# Patient Record
Sex: MALE | Race: WHITE | NOT HISPANIC OR LATINO | Employment: FULL TIME | URBAN - METROPOLITAN AREA
[De-identification: names, ages, dates, MRNs, and addresses within clinical notes are randomized per-mention and may not be internally consistent; named-entity substitution may affect disease eponyms.]

---

## 2024-03-27 ENCOUNTER — HOSPITAL ENCOUNTER (EMERGENCY)
Facility: HOSPITAL | Age: 39
Discharge: HOME | End: 2024-03-27
Attending: EMERGENCY MEDICINE
Payer: COMMERCIAL

## 2024-03-27 ENCOUNTER — APPOINTMENT (OUTPATIENT)
Dept: CARDIOLOGY | Facility: HOSPITAL | Age: 39
End: 2024-03-27
Payer: COMMERCIAL

## 2024-03-27 VITALS
DIASTOLIC BLOOD PRESSURE: 74 MMHG | HEART RATE: 97 BPM | WEIGHT: 207.23 LBS | BODY MASS INDEX: 33.3 KG/M2 | TEMPERATURE: 97.3 F | SYSTOLIC BLOOD PRESSURE: 138 MMHG | OXYGEN SATURATION: 100 % | HEIGHT: 66 IN | RESPIRATION RATE: 17 BRPM

## 2024-03-27 DIAGNOSIS — R19.7 DIARRHEA, UNSPECIFIED TYPE: ICD-10-CM

## 2024-03-27 DIAGNOSIS — T78.40XA ALLERGIC REACTION, INITIAL ENCOUNTER: Primary | ICD-10-CM

## 2024-03-27 LAB
ALBUMIN SERPL BCP-MCNC: 4.2 G/DL (ref 3.4–5)
ALP SERPL-CCNC: 51 U/L (ref 33–120)
ALT SERPL W P-5'-P-CCNC: 21 U/L (ref 10–52)
ANION GAP SERPL CALC-SCNC: 17 MMOL/L (ref 10–20)
AST SERPL W P-5'-P-CCNC: 20 U/L (ref 9–39)
BASOPHILS # BLD AUTO: 0.03 X10*3/UL (ref 0–0.1)
BASOPHILS NFR BLD AUTO: 0.3 %
BILIRUB SERPL-MCNC: 0.6 MG/DL (ref 0–1.2)
BUN SERPL-MCNC: 20 MG/DL (ref 6–23)
CALCIUM SERPL-MCNC: 9 MG/DL (ref 8.6–10.3)
CHLORIDE SERPL-SCNC: 107 MMOL/L (ref 98–107)
CO2 SERPL-SCNC: 16 MMOL/L (ref 21–32)
CREAT SERPL-MCNC: 1.23 MG/DL (ref 0.5–1.3)
EGFRCR SERPLBLD CKD-EPI 2021: 78 ML/MIN/1.73M*2
EOSINOPHIL # BLD AUTO: 0.24 X10*3/UL (ref 0–0.7)
EOSINOPHIL NFR BLD AUTO: 2.1 %
ERYTHROCYTE [DISTWIDTH] IN BLOOD BY AUTOMATED COUNT: 12.4 % (ref 11.5–14.5)
FLUAV RNA RESP QL NAA+PROBE: NOT DETECTED
FLUBV RNA RESP QL NAA+PROBE: NOT DETECTED
GLUCOSE SERPL-MCNC: 125 MG/DL (ref 74–99)
HCT VFR BLD AUTO: 54.5 % (ref 41–52)
HGB BLD-MCNC: 17.9 G/DL (ref 13.5–17.5)
IMM GRANULOCYTES # BLD AUTO: 0.04 X10*3/UL (ref 0–0.7)
IMM GRANULOCYTES NFR BLD AUTO: 0.4 % (ref 0–0.9)
LACTATE SERPL-SCNC: 2.1 MMOL/L (ref 0.4–2)
LIPASE SERPL-CCNC: 19 U/L (ref 9–82)
LYMPHOCYTES # BLD AUTO: 3.75 X10*3/UL (ref 1.2–4.8)
LYMPHOCYTES NFR BLD AUTO: 33.4 %
MAGNESIUM SERPL-MCNC: 2.02 MG/DL (ref 1.6–2.4)
MCH RBC QN AUTO: 29.3 PG (ref 26–34)
MCHC RBC AUTO-ENTMCNC: 32.8 G/DL (ref 32–36)
MCV RBC AUTO: 89 FL (ref 80–100)
MONOCYTES # BLD AUTO: 1.07 X10*3/UL (ref 0.1–1)
MONOCYTES NFR BLD AUTO: 9.5 %
NEUTROPHILS # BLD AUTO: 6.09 X10*3/UL (ref 1.2–7.7)
NEUTROPHILS NFR BLD AUTO: 54.3 %
NRBC BLD-RTO: 0 /100 WBCS (ref 0–0)
PHOSPHATE SERPL-MCNC: 4.4 MG/DL (ref 2.5–4.9)
PLATELET # BLD AUTO: 334 X10*3/UL (ref 150–450)
POTASSIUM SERPL-SCNC: 4 MMOL/L (ref 3.5–5.3)
PROT SERPL-MCNC: 7.6 G/DL (ref 6.4–8.2)
RBC # BLD AUTO: 6.1 X10*6/UL (ref 4.5–5.9)
SARS-COV-2 RNA RESP QL NAA+PROBE: NOT DETECTED
SODIUM SERPL-SCNC: 136 MMOL/L (ref 136–145)
WBC # BLD AUTO: 11.2 X10*3/UL (ref 4.4–11.3)

## 2024-03-27 PROCEDURE — 87502 INFLUENZA DNA AMP PROBE: CPT | Performed by: EMERGENCY MEDICINE

## 2024-03-27 PROCEDURE — 85025 COMPLETE CBC W/AUTO DIFF WBC: CPT | Performed by: EMERGENCY MEDICINE

## 2024-03-27 PROCEDURE — 93005 ELECTROCARDIOGRAM TRACING: CPT

## 2024-03-27 PROCEDURE — 83735 ASSAY OF MAGNESIUM: CPT | Performed by: EMERGENCY MEDICINE

## 2024-03-27 PROCEDURE — 99284 EMERGENCY DEPT VISIT MOD MDM: CPT | Mod: 25

## 2024-03-27 PROCEDURE — 83690 ASSAY OF LIPASE: CPT | Performed by: EMERGENCY MEDICINE

## 2024-03-27 PROCEDURE — 2500000004 HC RX 250 GENERAL PHARMACY W/ HCPCS (ALT 636 FOR OP/ED): Performed by: EMERGENCY MEDICINE

## 2024-03-27 PROCEDURE — 36415 COLL VENOUS BLD VENIPUNCTURE: CPT | Performed by: EMERGENCY MEDICINE

## 2024-03-27 PROCEDURE — 96374 THER/PROPH/DIAG INJ IV PUSH: CPT

## 2024-03-27 PROCEDURE — 84100 ASSAY OF PHOSPHORUS: CPT | Performed by: EMERGENCY MEDICINE

## 2024-03-27 PROCEDURE — 83605 ASSAY OF LACTIC ACID: CPT | Performed by: EMERGENCY MEDICINE

## 2024-03-27 PROCEDURE — 80053 COMPREHEN METABOLIC PANEL: CPT | Performed by: EMERGENCY MEDICINE

## 2024-03-27 PROCEDURE — 96361 HYDRATE IV INFUSION ADD-ON: CPT

## 2024-03-27 PROCEDURE — 96375 TX/PRO/DX INJ NEW DRUG ADDON: CPT

## 2024-03-27 RX ORDER — FAMOTIDINE 10 MG/ML
20 INJECTION INTRAVENOUS ONCE
Status: COMPLETED | OUTPATIENT
Start: 2024-03-27 | End: 2024-03-27

## 2024-03-27 RX ORDER — DIPHENHYDRAMINE HYDROCHLORIDE 50 MG/ML
50 INJECTION INTRAMUSCULAR; INTRAVENOUS ONCE
Status: COMPLETED | OUTPATIENT
Start: 2024-03-27 | End: 2024-03-27

## 2024-03-27 RX ORDER — PREDNISONE 50 MG/1
50 TABLET ORAL DAILY
Qty: 2 TABLET | Refills: 0 | Status: SHIPPED | OUTPATIENT
Start: 2024-03-27 | End: 2024-03-29

## 2024-03-27 RX ADMIN — SODIUM CHLORIDE 1000 ML: 9 INJECTION, SOLUTION INTRAVENOUS at 15:50

## 2024-03-27 RX ADMIN — METHYLPREDNISOLONE SODIUM SUCCINATE 125 MG: 125 INJECTION, POWDER, FOR SOLUTION INTRAMUSCULAR; INTRAVENOUS at 15:58

## 2024-03-27 RX ADMIN — DIPHENHYDRAMINE HYDROCHLORIDE 50 MG: 50 INJECTION, SOLUTION INTRAMUSCULAR; INTRAVENOUS at 16:01

## 2024-03-27 RX ADMIN — FAMOTIDINE 20 MG: 10 INJECTION, SOLUTION INTRAVENOUS at 16:05

## 2024-03-27 ASSESSMENT — LIFESTYLE VARIABLES
HAVE YOU EVER FELT YOU SHOULD CUT DOWN ON YOUR DRINKING: NO
EVER FELT BAD OR GUILTY ABOUT YOUR DRINKING: NO
TOTAL SCORE: 0
EVER HAD A DRINK FIRST THING IN THE MORNING TO STEADY YOUR NERVES TO GET RID OF A HANGOVER: NO
HAVE PEOPLE ANNOYED YOU BY CRITICIZING YOUR DRINKING: NO

## 2024-03-27 ASSESSMENT — COLUMBIA-SUICIDE SEVERITY RATING SCALE - C-SSRS
6. HAVE YOU EVER DONE ANYTHING, STARTED TO DO ANYTHING, OR PREPARED TO DO ANYTHING TO END YOUR LIFE?: NO
2. HAVE YOU ACTUALLY HAD ANY THOUGHTS OF KILLING YOURSELF?: NO
1. IN THE PAST MONTH, HAVE YOU WISHED YOU WERE DEAD OR WISHED YOU COULD GO TO SLEEP AND NOT WAKE UP?: NO

## 2024-03-27 ASSESSMENT — PAIN - FUNCTIONAL ASSESSMENT: PAIN_FUNCTIONAL_ASSESSMENT: 0-10

## 2024-03-27 ASSESSMENT — PAIN DESCRIPTION - LOCATION: LOCATION: ABDOMEN

## 2024-03-27 ASSESSMENT — PAIN SCALES - GENERAL: PAINLEVEL_OUTOF10: 6

## 2024-03-27 NOTE — ED TRIAGE NOTES
Pt having abd pain with diarrhea that started 2 days ago. Pt started having itchy red hives all over his body today around 2 pm and took benadryl. Pt's heart rate is in the 150.

## 2024-03-27 NOTE — ED PROVIDER NOTES
History & Physical    CC: Abdominal Pain  HPI:  38 y.o. male with history of anxiety, who presents emerged department today with chief complaint of allergic reaction.  Patient states that he has had diarrhea symptoms since yesterday evening.  He states this has happened before and he tried taking Imodium at home last night and again this morning.  At 2 PM this afternoon he began having the hives across his trunk, extremities, back.  Attempted to take a dose of Benadryl 25 mg but this did not help so he came to the emergency department for evaluation.  He is never had anything like this before.  Denies any preceding or new exposures to medications, lotions, detergents, foods.  Denies any difficulty breathing or swelling in his oropharynx.  Denies any nausea or vomiting.  States that he has some diarrhea, but this is continuation from yesterday evening.    Additional Limitations to History: None  External Records Reviewed: I reviewed recent and relevant outside records including recent admission H&P as well as discharge summary  History Obtained From: Patient    Past Medical History: Per HPI  Medications: Reviewed in EMR and with patient  Allergies:   Allergies   Allergen Reactions    Zofran [Ondansetron Hcl] Unknown     Past Surgical History: Hernia repair.   Social History: Occasional alcohol use.  Denies tobacco or illicit drug use.    ----------------------------------------------------------------------------------------------------  Physical Exam:  --Vital signs reviewed: T 36.3 °C (97.3 °F)  HR (!) 150  /81  RR 18  O2 96 %    GEN:  A&Ox3, no acute distress, appears comfortable.  Conversational and appropriate.  No confusion or gross mental status changes.  EYES: EOMI, non-injected sclera.  ENT: Moist mucous membranes, no apparent injuries or lesions.  Oropharynx without any swelling.  CARDIO: Tachycardic rate and regular rhythm. No murmurs, rubs, or gallops.  2+ equal pulses of the distal extremities.    PULM: Clear to auscultation bilaterally. No rales, rhonchi, or wheezes. Good symmetric chest expansion.  GI: Soft, non-tender, non-distended. No rebound tenderness or guarding.  SKIN: Hives across the patient's thorax, upper and lower extremities.  MSK: ROM intact the extremities without contractures.   EXT: No peripheral edema, contusions, or wounds.   NEURO: Cranial nerves II-XII grossly intact. Sensation to light touch intact and equal bilaterally in upper and lower extremities.  Symmetric 5/5 strength in upper and lower extremities.  PSYCH: Appropriate mood and behavior, converses and responds appropriately during exam.  -----------------------------------------------------------------------------------------------------    Medical Decision Making   Patient seen and evaluated by ED attending. On arrival the patient is in stable condition with vital signs demonstrating heart rate in the 140s, otherwise within normal limits, and afebrile.  Clinically, the patient appears to be having allergic reaction with diffuse hives across his thorax and upper and lower extremities.  He does not have any other signs or symptoms of allergies: No difficulty breathing, shortness of breath, swelling of the oropharynx, nausea or vomiting.  Treatment was initiated for allergic reaction including IV fluids, Pepcid, Solu-Medrol, Benadryl.  Epinephrine was held this time as there appears to be only 1 system involvement, but the patient will be continually monitored and reassessed.    Patient's diarrhea has had since yesterday has been nonbloody and he denies any systemic fevers or chills.  Basic labs are sent to assess for electrolyte abnormality secondary to this.    - EKG interpreted by myself (ED attending physician): Ventricular rate 145 bpm.  Sinus tachycardia with occasional PVC.  Normal intervals.  No ST or T wave elevations or inversions consistent with acute ischemia.  - Labs unremarkable    On reassessment, the patient has  complete resolution of his hives, it feels improved.  His heart rate has improved from 140s down to 80 after liter of fluids.  He is encouraged to continue taking Imodium at home, encouraged follow-up with allergy/immunology and is given referral.  He is given 2 more days worth of prednisone, instructed to take Benadryl as needed for allergy type symptoms.    Diagnoses as of 03/27/24 1717   Allergic reaction, initial encounter   Diarrhea, unspecified type       Escalation of Care: Appropriate for discharge  Social Determinants of Health Significantly Affecting Care: None identified        Domingo Miller MD   Attending Physician      Domingo Miller MD [facility]  03/27/24 8182

## 2024-04-01 LAB
ATRIAL RATE: 145 BPM
P AXIS: 71 DEGREES
P OFFSET: 204 MS
P ONSET: 152 MS
PR INTERVAL: 128 MS
Q ONSET: 216 MS
QRS COUNT: 24 BEATS
QRS DURATION: 82 MS
QT INTERVAL: 270 MS
QTC CALCULATION(BAZETT): 419 MS
QTC FREDERICIA: 362 MS
R AXIS: 55 DEGREES
T AXIS: 163 DEGREES
T OFFSET: 351 MS
VENTRICULAR RATE: 145 BPM

## 2024-06-02 ENCOUNTER — APPOINTMENT (OUTPATIENT)
Dept: CARDIOLOGY | Facility: HOSPITAL | Age: 39
End: 2024-06-02
Payer: COMMERCIAL

## 2024-06-02 ENCOUNTER — APPOINTMENT (OUTPATIENT)
Dept: RADIOLOGY | Facility: HOSPITAL | Age: 39
End: 2024-06-02
Payer: COMMERCIAL

## 2024-06-02 ENCOUNTER — HOSPITAL ENCOUNTER (EMERGENCY)
Facility: HOSPITAL | Age: 39
Discharge: HOME | End: 2024-06-02
Attending: EMERGENCY MEDICINE
Payer: COMMERCIAL

## 2024-06-02 VITALS
HEART RATE: 95 BPM | DIASTOLIC BLOOD PRESSURE: 80 MMHG | HEIGHT: 66 IN | BODY MASS INDEX: 35.36 KG/M2 | RESPIRATION RATE: 16 BRPM | SYSTOLIC BLOOD PRESSURE: 134 MMHG | OXYGEN SATURATION: 98 % | WEIGHT: 220 LBS | TEMPERATURE: 100.8 F

## 2024-06-02 DIAGNOSIS — J18.9 PNEUMONIA OF LEFT LUNG DUE TO INFECTIOUS ORGANISM, UNSPECIFIED PART OF LUNG: Primary | ICD-10-CM

## 2024-06-02 LAB
ALBUMIN SERPL BCP-MCNC: 4.1 G/DL (ref 3.4–5)
ALP SERPL-CCNC: 84 U/L (ref 33–120)
ALT SERPL W P-5'-P-CCNC: 20 U/L (ref 10–52)
ANION GAP SERPL CALC-SCNC: 12 MMOL/L (ref 10–20)
AST SERPL W P-5'-P-CCNC: 16 U/L (ref 9–39)
BASOPHILS # BLD AUTO: 0.06 X10*3/UL (ref 0–0.1)
BASOPHILS NFR BLD AUTO: 0.4 %
BILIRUB SERPL-MCNC: 0.3 MG/DL (ref 0–1.2)
BUN SERPL-MCNC: 21 MG/DL (ref 6–23)
CALCIUM SERPL-MCNC: 9.1 MG/DL (ref 8.6–10.3)
CARDIAC TROPONIN I PNL SERPL HS: 4 NG/L (ref 0–20)
CHLORIDE SERPL-SCNC: 108 MMOL/L (ref 98–107)
CO2 SERPL-SCNC: 25 MMOL/L (ref 21–32)
CREAT SERPL-MCNC: 1.09 MG/DL (ref 0.5–1.3)
EGFRCR SERPLBLD CKD-EPI 2021: 90 ML/MIN/1.73M*2
EOSINOPHIL # BLD AUTO: 0.46 X10*3/UL (ref 0–0.7)
EOSINOPHIL NFR BLD AUTO: 3.3 %
ERYTHROCYTE [DISTWIDTH] IN BLOOD BY AUTOMATED COUNT: 12.9 % (ref 11.5–14.5)
GLUCOSE SERPL-MCNC: 104 MG/DL (ref 74–99)
HCT VFR BLD AUTO: 42.9 % (ref 41–52)
HGB BLD-MCNC: 13.7 G/DL (ref 13.5–17.5)
IMM GRANULOCYTES # BLD AUTO: 0.03 X10*3/UL (ref 0–0.7)
IMM GRANULOCYTES NFR BLD AUTO: 0.2 % (ref 0–0.9)
LYMPHOCYTES # BLD AUTO: 2.45 X10*3/UL (ref 1.2–4.8)
LYMPHOCYTES NFR BLD AUTO: 17.4 %
MCH RBC QN AUTO: 29.8 PG (ref 26–34)
MCHC RBC AUTO-ENTMCNC: 31.9 G/DL (ref 32–36)
MCV RBC AUTO: 93 FL (ref 80–100)
MONOCYTES # BLD AUTO: 0.56 X10*3/UL (ref 0.1–1)
MONOCYTES NFR BLD AUTO: 4 %
NEUTROPHILS # BLD AUTO: 10.55 X10*3/UL (ref 1.2–7.7)
NEUTROPHILS NFR BLD AUTO: 74.7 %
NRBC BLD-RTO: 0 /100 WBCS (ref 0–0)
PLATELET # BLD AUTO: 233 X10*3/UL (ref 150–450)
POTASSIUM SERPL-SCNC: 3.7 MMOL/L (ref 3.5–5.3)
PROT SERPL-MCNC: 6.7 G/DL (ref 6.4–8.2)
RBC # BLD AUTO: 4.6 X10*6/UL (ref 4.5–5.9)
SARS-COV-2 RNA RESP QL NAA+PROBE: NOT DETECTED
SODIUM SERPL-SCNC: 141 MMOL/L (ref 136–145)
WBC # BLD AUTO: 14.1 X10*3/UL (ref 4.4–11.3)

## 2024-06-02 PROCEDURE — 87635 SARS-COV-2 COVID-19 AMP PRB: CPT | Performed by: EMERGENCY MEDICINE

## 2024-06-02 PROCEDURE — 99283 EMERGENCY DEPT VISIT LOW MDM: CPT | Mod: 25

## 2024-06-02 PROCEDURE — 85025 COMPLETE CBC W/AUTO DIFF WBC: CPT | Performed by: EMERGENCY MEDICINE

## 2024-06-02 PROCEDURE — 71046 X-RAY EXAM CHEST 2 VIEWS: CPT

## 2024-06-02 PROCEDURE — 36415 COLL VENOUS BLD VENIPUNCTURE: CPT | Performed by: EMERGENCY MEDICINE

## 2024-06-02 PROCEDURE — 93005 ELECTROCARDIOGRAM TRACING: CPT

## 2024-06-02 PROCEDURE — 84484 ASSAY OF TROPONIN QUANT: CPT | Performed by: EMERGENCY MEDICINE

## 2024-06-02 PROCEDURE — 71046 X-RAY EXAM CHEST 2 VIEWS: CPT | Mod: FOREIGN READ | Performed by: RADIOLOGY

## 2024-06-02 PROCEDURE — 2500000001 HC RX 250 WO HCPCS SELF ADMINISTERED DRUGS (ALT 637 FOR MEDICARE OP): Performed by: EMERGENCY MEDICINE

## 2024-06-02 PROCEDURE — 80053 COMPREHEN METABOLIC PANEL: CPT | Performed by: EMERGENCY MEDICINE

## 2024-06-02 RX ORDER — AZITHROMYCIN 500 MG/1
500 TABLET, FILM COATED ORAL ONCE
Status: COMPLETED | OUTPATIENT
Start: 2024-06-02 | End: 2024-06-02

## 2024-06-02 RX ORDER — AMOXICILLIN 250 MG/1
1000 CAPSULE ORAL EVERY 8 HOURS SCHEDULED
Status: DISCONTINUED | OUTPATIENT
Start: 2024-06-02 | End: 2024-06-02 | Stop reason: HOSPADM

## 2024-06-02 RX ORDER — ACETAMINOPHEN 325 MG/1
650 TABLET ORAL ONCE
Status: COMPLETED | OUTPATIENT
Start: 2024-06-02 | End: 2024-06-02

## 2024-06-02 RX ORDER — AMOXICILLIN 500 MG/1
1000 CAPSULE ORAL 3 TIMES DAILY
Qty: 30 CAPSULE | Refills: 0 | Status: SHIPPED | OUTPATIENT
Start: 2024-06-02 | End: 2024-06-07

## 2024-06-02 RX ORDER — AZITHROMYCIN 250 MG/1
250 TABLET, FILM COATED ORAL DAILY
Qty: 4 TABLET | Refills: 0 | Status: SHIPPED | OUTPATIENT
Start: 2024-06-03

## 2024-06-02 RX ADMIN — AMOXICILLIN 1000 MG: 250 CAPSULE ORAL at 05:39

## 2024-06-02 RX ADMIN — ACETAMINOPHEN 650 MG: 325 TABLET ORAL at 04:19

## 2024-06-02 RX ADMIN — AZITHROMYCIN DIHYDRATE 500 MG: 500 TABLET ORAL at 05:39

## 2024-06-02 ASSESSMENT — PAIN DESCRIPTION - DESCRIPTORS
DESCRIPTORS: CRUSHING
DESCRIPTORS: PRESSURE

## 2024-06-02 ASSESSMENT — PAIN SCALES - GENERAL
PAINLEVEL_OUTOF10: 7
PAINLEVEL_OUTOF10: 8

## 2024-06-02 ASSESSMENT — COLUMBIA-SUICIDE SEVERITY RATING SCALE - C-SSRS
2. HAVE YOU ACTUALLY HAD ANY THOUGHTS OF KILLING YOURSELF?: NO
6. HAVE YOU EVER DONE ANYTHING, STARTED TO DO ANYTHING, OR PREPARED TO DO ANYTHING TO END YOUR LIFE?: NO
1. IN THE PAST MONTH, HAVE YOU WISHED YOU WERE DEAD OR WISHED YOU COULD GO TO SLEEP AND NOT WAKE UP?: NO

## 2024-06-02 ASSESSMENT — PAIN DESCRIPTION - DIRECTION: RADIATING_TOWARDS: SHOULDER AND ARM

## 2024-06-02 ASSESSMENT — PAIN DESCRIPTION - FREQUENCY: FREQUENCY: CONSTANT/CONTINUOUS

## 2024-06-02 ASSESSMENT — PAIN DESCRIPTION - ONSET: ONSET: AWAKENED FROM SLEEP

## 2024-06-02 ASSESSMENT — LIFESTYLE VARIABLES
TOTAL SCORE: 0
EVER HAD A DRINK FIRST THING IN THE MORNING TO STEADY YOUR NERVES TO GET RID OF A HANGOVER: NO
HAVE PEOPLE ANNOYED YOU BY CRITICIZING YOUR DRINKING: NO
HAVE YOU EVER FELT YOU SHOULD CUT DOWN ON YOUR DRINKING: NO
EVER FELT BAD OR GUILTY ABOUT YOUR DRINKING: NO

## 2024-06-02 ASSESSMENT — PAIN - FUNCTIONAL ASSESSMENT: PAIN_FUNCTIONAL_ASSESSMENT: 0-10

## 2024-06-02 ASSESSMENT — PAIN DESCRIPTION - ORIENTATION: ORIENTATION: LEFT

## 2024-06-02 ASSESSMENT — PAIN DESCRIPTION - LOCATION: LOCATION: CHEST

## 2024-06-02 ASSESSMENT — PAIN DESCRIPTION - PAIN TYPE: TYPE: ACUTE PAIN

## 2024-06-02 NOTE — Clinical Note
Armen Botello was seen and treated in our emergency department on 6/2/2024.  He may return to work on 06/04/2024.       If you have any questions or concerns, please don't hesitate to call.      Devin Beckford MD

## 2024-06-02 NOTE — ED PROVIDER NOTES
HPI   Chief Complaint   Patient presents with    Chest Pain     Pt states was woken from sleep with with chest pain and shortness of breath        38-year-old male with history of anxiety presents to the ED for left-sided chest pain.  It woke him from sleep today.  He also has a cough.  No recent sick contacts.  Denies nausea or vomiting.  Has some shortness of breath.  Denies any cardiac disease.  Not a smoker.  No history of hypertension or hyperlipidemia or diabetes.                          Banks Coma Scale Score: 15                     Patient History   No past medical history on file.  No past surgical history on file.  No family history on file.  Social History     Tobacco Use    Smoking status: Never    Smokeless tobacco: Never   Vaping Use    Vaping status: Never Used   Substance Use Topics    Alcohol use: Never    Drug use: Never       Physical Exam   ED Triage Vitals [06/02/24 0349]   Temperature Heart Rate Respirations BP   (!) 38.2 °C (100.8 °F) 100 18 140/66      Pulse Ox Temp Source Heart Rate Source Patient Position   97 % Skin Monitor Lying      BP Location FiO2 (%)     Right arm 21 %       Physical Exam  Vitals and nursing note reviewed.   Constitutional:       Appearance: He is well-developed.   HENT:      Head: Normocephalic and atraumatic.   Eyes:      Extraocular Movements: Extraocular movements intact.      Pupils: Pupils are equal, round, and reactive to light.   Cardiovascular:      Rate and Rhythm: Normal rate and regular rhythm.   Pulmonary:      Effort: Pulmonary effort is normal.   Chest:      Chest wall: No mass.   Abdominal:      Palpations: Abdomen is soft.      Tenderness: There is no abdominal tenderness.   Musculoskeletal:         General: Normal range of motion.      Cervical back: Normal range of motion and neck supple.   Skin:     General: Skin is warm and dry.   Neurological:      General: No focal deficit present.      Mental Status: He is alert and oriented to person,  place, and time.   Psychiatric:         Mood and Affect: Mood normal.         Behavior: Behavior normal.         ED Course & MDM   Diagnoses as of 06/02/24 1906   Pneumonia of left lung due to infectious organism, unspecified part of lung       Medical Decision Making  38-year-old male presents to the ED for chest pain and cough.  He is also febrile.  Obtain labs which showed leukocytosis.  X-ray concerning for pneumonia.  He is not hypoxic.  He will be treated for community-acquired pneumonia with amoxicillin and azithromycin.  He is stable for outpatient treatment.  I recommend primary care follow-up.  All questions were answered.        Procedure  Procedures     Devin Beckford MD  06/02/24 1907

## 2024-06-07 ENCOUNTER — APPOINTMENT (OUTPATIENT)
Dept: RADIOLOGY | Facility: HOSPITAL | Age: 39
End: 2024-06-07
Payer: COMMERCIAL

## 2024-06-07 ENCOUNTER — HOSPITAL ENCOUNTER (EMERGENCY)
Facility: HOSPITAL | Age: 39
Discharge: HOME | End: 2024-06-07
Payer: COMMERCIAL

## 2024-06-07 VITALS
BODY MASS INDEX: 35.36 KG/M2 | TEMPERATURE: 99.3 F | RESPIRATION RATE: 18 BRPM | WEIGHT: 220 LBS | HEIGHT: 66 IN | SYSTOLIC BLOOD PRESSURE: 114 MMHG | OXYGEN SATURATION: 95 % | DIASTOLIC BLOOD PRESSURE: 66 MMHG | HEART RATE: 92 BPM

## 2024-06-07 DIAGNOSIS — S93.402A SPRAIN OF LEFT ANKLE, INITIAL ENCOUNTER: Primary | ICD-10-CM

## 2024-06-07 DIAGNOSIS — S70.02XA CONTUSION OF LEFT HIP, INITIAL ENCOUNTER: ICD-10-CM

## 2024-06-07 PROCEDURE — 73502 X-RAY EXAM HIP UNI 2-3 VIEWS: CPT | Mod: LT

## 2024-06-07 PROCEDURE — 71046 X-RAY EXAM CHEST 2 VIEWS: CPT

## 2024-06-07 PROCEDURE — 73502 X-RAY EXAM HIP UNI 2-3 VIEWS: CPT | Mod: LEFT SIDE | Performed by: RADIOLOGY

## 2024-06-07 PROCEDURE — 73610 X-RAY EXAM OF ANKLE: CPT | Mod: LT

## 2024-06-07 PROCEDURE — 99284 EMERGENCY DEPT VISIT MOD MDM: CPT

## 2024-06-07 PROCEDURE — 71046 X-RAY EXAM CHEST 2 VIEWS: CPT | Performed by: RADIOLOGY

## 2024-06-07 PROCEDURE — 73610 X-RAY EXAM OF ANKLE: CPT | Mod: LEFT SIDE | Performed by: RADIOLOGY

## 2024-06-07 RX ORDER — ACETAMINOPHEN 325 MG/1
650 TABLET ORAL ONCE
Status: COMPLETED | OUTPATIENT
Start: 2024-06-07 | End: 2024-06-07

## 2024-06-07 RX ADMIN — ACETAMINOPHEN 650 MG: 325 TABLET ORAL at 19:41

## 2024-06-07 ASSESSMENT — PAIN DESCRIPTION - LOCATION: LOCATION: ANKLE

## 2024-06-07 ASSESSMENT — PAIN DESCRIPTION - PAIN TYPE: TYPE: ACUTE PAIN

## 2024-06-07 ASSESSMENT — PAIN DESCRIPTION - PROGRESSION: CLINICAL_PROGRESSION: NOT CHANGED

## 2024-06-07 ASSESSMENT — PAIN - FUNCTIONAL ASSESSMENT: PAIN_FUNCTIONAL_ASSESSMENT: 0-10

## 2024-06-07 ASSESSMENT — PAIN SCALES - GENERAL: PAINLEVEL_OUTOF10: 5 - MODERATE PAIN

## 2024-06-07 ASSESSMENT — PAIN DESCRIPTION - ORIENTATION: ORIENTATION: LEFT

## 2024-06-07 NOTE — ED NOTES
"PT arrives with c/o left hip and ankle pain. Pt reports falling in the bathtub last night. Pt unable to recall incident d/t \"I was VERY inebriated\". Pt currently in no obvious distress.      Diogo Houston RN  06/07/24 1937    "

## 2024-06-08 NOTE — ED PROVIDER NOTES
HPI   Chief Complaint   Patient presents with    Ankle Pain    Hip Pain       CC: Left ankle, left hip tenderness  HPI:   38-year-old male presents ED complaining of left hip, left ankle tenderness after he slipped in the bathtub last night, patient denies any head injury however he did say that he was drinking some alcohol last night, he was recently seen in the emergency room for pneumonia on 6/2, patient reports tenderness with weightbearing or ambulating on the left lower extremity denies any prior injuries or surgeries to the left hip or ankle he denies any tenderness to the left knee denies any chest pain abdominal pain no headache, dizziness.    Additional Limitations to History:   External Records Reviewed: I reviewed recent and relevant outside records including   History Obtained From:     Past Medical History: Per HPI  Medications: Reviewed in EMR and with patient  Allergies:  Reviewed in EMR  Past Surgical History:   Social History:     ------------------------------------------------------------------------------------------------------  Physical Exam:  --Vital signs reviewed in nursing triage note, EMR flow sheets, and at patient's bedside  GEN:  A&Ox3, no acute distress, appears comfortable.  Conversational and appropriate.  No confusion or gross mental status changes.  EYES: EOMI, non-injected sclera.  ENT: Moist mucous membranes, no apparent injuries or lesions.   CARDIO: Normal rate and regular rhythm. No murmurs, rubs, or gallops.  2+ equal pulses of the distal extremities.   PULM: Clear to auscultation bilaterally. No rales, rhonchi, or wheezes. Good symmetric chest expansion.  GI: Soft, non-tender, non-distended. No rebound tenderness or guarding.  SKIN: Warm and dry, no rashes or lesions.  MSK: ROM intact the extremities without contractures.   EXT: Ankle Injury:  Lower extremity: There is mild soft tissue swelling over tenderness over the medial malleolus. There is mild soft tissue swelling  tenderness over the lateral malleolus. There is no tenderness over the anterior ankle mortise. The foot is nontender. The skin is intact. Is neurovascularly intact distally. The remainder of the extremity is nontender, specifically, nontender over the knee and fibular head. Mild tenderness to the left lateral hip.  Left lower extremity is neurovascular intact distally.  NEURO: Cranial nerves II-XII grossly intact. Sensation to light touch intact and equal bilaterally in upper and lower extremities.  Symmetric 5/5 strength in upper and lower extremities.  PSYCH: Appropriate mood and behavior, converses and responds appropriately during exam.  -------------------------------------------------------------------------------------------------------         Differential Diagnoses Considered:   Chronic Medical Conditions Significantly Affecting Care:   Diagnostic testing considered: [PERC, D-Dimer, PECARN, etc.]      - I independently interpreted: [CXR, CT, POCUS, etc. including your interpretation]  - Labs notable for     Escalation of Care: Appropriate for  Social Determinants of Health Significantly Affecting Care: [Homelessness, lacking transportation, uninsured, unable to afford medications]  Prescription Drug Consideration: [Antibiotics, antivirals, pain medications, etc.]  Discussion of Management with Other Providers:  I discussed the patient/results with: [admitting team, consultant, radiologist, social work, EPAT, case management, PT/OT, RT, PCP, etc.]      Joaquim Hoffmann PA-C                          Anneliese Coma Scale Score: 15                     Patient History   No past medical history on file.  No past surgical history on file.  No family history on file.  Social History     Tobacco Use    Smoking status: Never    Smokeless tobacco: Never   Vaping Use    Vaping status: Never Used   Substance Use Topics    Alcohol use: Never    Drug use: Never       Physical Exam   ED Triage Vitals [06/07/24 1913]    Temperature Heart Rate Respirations BP   (!) 38 °C (100.4 °F) (!) 110 18 138/79      Pulse Ox Temp Source Heart Rate Source Patient Position   95 % Temporal Monitor Sitting      BP Location FiO2 (%)     Left arm --       Physical Exam    ED Course & MDM   Diagnoses as of 06/07/24 2019   Sprain of left ankle, initial encounter   Contusion of left hip, initial encounter       Medical Decision Making  38-year-old male status post mechanical fall last night with left ankle sprain, left hip contusion, extremities neurovascular intact distally, air splint applied by the nurse crutches were provided with instructions patient nonweightbearing, continue with rest ice, elevation compression outpatient follow-up, chest x-ray improved        Procedure  Procedures     Joaquim Hoffmann PA-C  06/07/24 2023

## 2024-06-09 LAB
ATRIAL RATE: 105 BPM
P AXIS: 56 DEGREES
P OFFSET: 193 MS
P ONSET: 136 MS
PR INTERVAL: 158 MS
Q ONSET: 215 MS
QRS COUNT: 17 BEATS
QRS DURATION: 90 MS
QT INTERVAL: 304 MS
QTC CALCULATION(BAZETT): 401 MS
QTC FREDERICIA: 366 MS
R AXIS: 29 DEGREES
T AXIS: 45 DEGREES
T OFFSET: 367 MS
VENTRICULAR RATE: 105 BPM

## 2024-09-13 ENCOUNTER — APPOINTMENT (OUTPATIENT)
Dept: PRIMARY CARE | Facility: CLINIC | Age: 39
End: 2024-09-13
Payer: COMMERCIAL

## 2024-09-17 ENCOUNTER — APPOINTMENT (OUTPATIENT)
Dept: PRIMARY CARE | Facility: CLINIC | Age: 39
End: 2024-09-17
Payer: COMMERCIAL

## 2024-10-04 ENCOUNTER — OFFICE VISIT (OUTPATIENT)
Dept: PRIMARY CARE | Facility: CLINIC | Age: 39
End: 2024-10-04
Payer: COMMERCIAL

## 2024-10-04 VITALS
HEART RATE: 71 BPM | SYSTOLIC BLOOD PRESSURE: 124 MMHG | OXYGEN SATURATION: 99 % | DIASTOLIC BLOOD PRESSURE: 72 MMHG | TEMPERATURE: 97.5 F | BODY MASS INDEX: 40.5 KG/M2 | WEIGHT: 252 LBS | HEIGHT: 66 IN

## 2024-10-04 DIAGNOSIS — R53.83 OTHER FATIGUE: ICD-10-CM

## 2024-10-04 DIAGNOSIS — Z13.0 SCREENING FOR DEFICIENCY ANEMIA: ICD-10-CM

## 2024-10-04 DIAGNOSIS — Z13.228 SCREENING FOR METABOLIC DISORDER: ICD-10-CM

## 2024-10-04 DIAGNOSIS — Z11.59 NEED FOR HEPATITIS C SCREENING TEST: ICD-10-CM

## 2024-10-04 DIAGNOSIS — R73.02 IMPAIRED GLUCOSE TOLERANCE: ICD-10-CM

## 2024-10-04 DIAGNOSIS — Z00.00 ROUTINE ADULT HEALTH MAINTENANCE: Primary | ICD-10-CM

## 2024-10-04 DIAGNOSIS — F41.9 ANXIETY: ICD-10-CM

## 2024-10-04 PROBLEM — I10 ESSENTIAL HYPERTENSION: Status: ACTIVE | Noted: 2024-10-04

## 2024-10-04 PROBLEM — E78.2 HYPERLIPEMIA, MIXED: Status: ACTIVE | Noted: 2024-10-04

## 2024-10-04 PROCEDURE — 3074F SYST BP LT 130 MM HG: CPT | Performed by: FAMILY MEDICINE

## 2024-10-04 PROCEDURE — 3008F BODY MASS INDEX DOCD: CPT | Performed by: FAMILY MEDICINE

## 2024-10-04 PROCEDURE — 99203 OFFICE O/P NEW LOW 30 MIN: CPT | Performed by: FAMILY MEDICINE

## 2024-10-04 PROCEDURE — 3078F DIAST BP <80 MM HG: CPT | Performed by: FAMILY MEDICINE

## 2024-10-04 PROCEDURE — 1036F TOBACCO NON-USER: CPT | Performed by: FAMILY MEDICINE

## 2024-10-04 PROCEDURE — 99385 PREV VISIT NEW AGE 18-39: CPT | Performed by: FAMILY MEDICINE

## 2024-10-04 RX ORDER — PAROXETINE HYDROCHLORIDE 20 MG/1
20 TABLET, FILM COATED ORAL
COMMUNITY
Start: 2024-06-28

## 2024-10-04 ASSESSMENT — PAIN SCALES - GENERAL: PAINLEVEL: 0-NO PAIN

## 2024-10-04 ASSESSMENT — PATIENT HEALTH QUESTIONNAIRE - PHQ9
2. FEELING DOWN, DEPRESSED OR HOPELESS: NOT AT ALL
SUM OF ALL RESPONSES TO PHQ9 QUESTIONS 1 AND 2: 0
1. LITTLE INTEREST OR PLEASURE IN DOING THINGS: NOT AT ALL

## 2024-10-04 NOTE — ASSESSMENT & PLAN NOTE
- Will monitor A1c sugar average secondary to previous reports of impaired glucose metabolism  -Continue to focus on healthy, balanced diet with moderation of carbohydrates/processed sugars

## 2024-10-04 NOTE — ASSESSMENT & PLAN NOTE
- While fatigue may be significantly impacted by shift work, will check blood work for any contributing deficiencies or metabolic abnormalities

## 2024-10-04 NOTE — PATIENT INSTRUCTIONS
Problem List Items Addressed This Visit             ICD-10-CM    Anxiety F41.9     - Continue with current regimen following up with established provider  -If you notice ongoing weight issues, would recommend discussing potential adjustment of Paxil as this is a common side effect         Impaired glucose tolerance R73.02     - Will monitor A1c sugar average secondary to previous reports of impaired glucose metabolism  -Continue to focus on healthy, balanced diet with moderation of carbohydrates/processed sugars         Relevant Orders    Hemoglobin A1c    Other fatigue R53.83     - While fatigue may be significantly impacted by shift work, will check blood work for any contributing deficiencies or metabolic abnormalities         Relevant Orders    Testosterone, total and free    Vitamin D 25-Hydroxy,Total (for eval of Vitamin D levels)    Vitamin B12     Other Visit Diagnoses         Codes    Routine adult health maintenance    -  Primary Z00.00    Relevant Orders    TSH with reflex to Free T4 if abnormal    Lipid Panel    Comprehensive Metabolic Panel    CBC    Hepatitis C antibody    Screening for deficiency anemia     Z13.0    Relevant Orders    CBC    Screening for metabolic disorder     Z13.228    Relevant Orders    TSH with reflex to Free T4 if abnormal    Lipid Panel    Comprehensive Metabolic Panel    Need for hepatitis C screening test     Z11.59    Relevant Orders    Hepatitis C antibody            Additional Visit Plans:  - Complete history and physical examination was performed    GENERAL RECOMMENDATIONS:  - Complete review of history of physical exam completed today  - A healthy diet to maintain a normal BMI (under 25) to reduce heart disease, risk for diabetes encouraged.  - Exercising 150 minutes per week and eating healthy to reduce heart disease.  - Blood pressure screen completed.    BLOOD TESTING:  - Orders for fasting routine blood work given today, to be completed at your earliest  convenience  - Will contact you with the blood work results once received and reviewed.    -Blood work recently completed prior to today's exam which was remarkable for ____    General Recommendations include:  - Cholesterol and diabetes screen if risk factors (overweight, high blood pressure).  - Sexually transmitted infections if risk factors.  - Hepatitis C virus screen for those born between [id number] - N/A /previously completed/ordered for blood work today    VACCINATIONS RECOMMENDATIONS:  - Flu shot annually - advocated seasonally/up-to-date  - Tetanus booster every 10 years - advocated/up-to-date  - Pneumonia vaccination starting at 65 years old (or earlier if risk factors - smoker, diabetic, heart or lung conditions) -not due yet/advocated/up-to-date  - Shingles vaccine for those 50 years or older - check with your insurance for SHINGRIX coverage and get it at your local pharmacy -not due yet/advocated/up-to-date  -COVID-19 vaccine series completed with patient currently due for booster/advocated    SCREENINGS RECOMMENDATIONS:  -Colon cancer screening (with colonoscopy or Cologuard) for men and women starting at age 45 until 74 years old - not due yet/discussed and advocated/up-to-date    (female)  - Cervical cancer screening (pap test) in women starting at age 21 until age 65 years old -not due yet/advocated to be completed with female /up-to-date  - Mammogram screening for breast cancer in women starting at 40-50 years and every 1-2 years until age 74 - not due yet/advocated/up-to-date  - Bone density screening (DEXA) for osteoporosis in women aged 65 years and older (in younger women who are higher risk) - not due yet/advocated/up-to-date    SMOKING HISTORY SCREENING RECOMMENDATIONS:  - For men and women who have a 30 pack year smoking history and currently smoke or have quit in the past 15 years, starting age 55 until age 80 years - a yearly low dose CT scan screening for lung cancer  is recommended - not due yet/advocated/up-to-date  - For men only who have smoked 100+ cigarettes in a lifetime, starting age 65, until 75 years old - a one time ultrasound screening for abdominal aneurysms - not due yet/advocated/up-to-date    Next Wellness Exam/Annual Physical Due    Counseling:       Medication education:         Education:  The patient is counseled regarding potential side-effects of all new medications        Understanding:  Patient expressed understanding        Adherence:  No barriers to adherence identified      ** Please excuse any errors in grammar or translation related to this dictation. Voice recognition software was utilized to prepare this document. **

## 2024-10-04 NOTE — ASSESSMENT & PLAN NOTE
- Continue with current regimen following up with established provider  -If you notice ongoing weight issues, would recommend discussing potential adjustment of Paxil as this is a common side effect

## 2024-10-04 NOTE — PROGRESS NOTES
Outpatient Visit Note    Chief Complaint   Patient presents with    New Patient Visit    Annual Exam       HPI:  Armen Botello is a 39 y.o. male who presents to the office as a new patient to establish care and for annual well exam    Patient has previously been seen in system with most recent visits through emergency department including visit in June secondary to pneumonia and sprained ankle via 2 separate visits.    States that symptoms resolved from pneumonia/ankle injury with no active complaints prominent fatigue.  States that this has been worse over the course the last 6 months.  Patient does work swing shift which creates for poor sleep habits.  This also exacerbates occasional poor eating habits.  Patient has lost significant weight through lifestyle modification previously though work has become more sedentary leading to progressive weight gain.    Of note, patient has been on Paxil for the last 9 months as prescribed by outside provider.  Notes that medication has been generally tolerated    Patient has had previous blood work completed with most recent panel on 6/2/2024 including CMP, CBC, troponin and COVID-19 test.  Blood work was notable for leukocytosis and mild nonfasting hyperglycemia in the setting of diagnosed pneumonia.  Patient had previous routine panel completed in January 2023 including lipid panel which was within normal limits.  Patient had previous CT performed in January 2023 which showed no signs of diabetes/prediabetes.    Well Exam:  Overall, they describe their health as good with no reports of recent illness or hospitalization, beyond that mentioned above. They state that their diet is fair. In regards to physical activity, he is more sedentary than his previous job. Denies any significant sleep complaints. Denies issues of chest pain, shortness of breath, headaches, vision/hearing changes, abdominal pain, vomiting, diarrhea, melena, hematochezia, constipation or urinary  symptoms.    Preventative Health Maintenance:  In regards to preventative health maintenance, last Tdap received unknown. Flu shot not typically received. COVID-19 vaccine series completed with patient currently due for booster.      Current Medications  Current Outpatient Medications   Medication Instructions    PARoxetine (PAXIL) 20 mg        Allergies  Allergies   Allergen Reactions    Ibuprofen Hives    Zofran [Ondansetron Hcl] Rash        Immunizations  Immunization History   Administered Date(s) Administered    Pfizer Gray Cap SARS-CoV-2 01/15/2022    Pfizer Purple Cap SARS-CoV-2 04/19/2021, 05/10/2021        History reviewed. No pertinent past medical history.   History reviewed. No pertinent surgical history.  Family History   Problem Relation Name Age of Onset    Hypertension Mother      Other (heart valve replacement) Father      Diabetes Mother's Sister      Diabetes Mother's Brother      Hypertension Paternal Grandfather       Social History     Tobacco Use    Smoking status: Never    Smokeless tobacco: Never   Vaping Use    Vaping status: Never Used   Substance Use Topics    Alcohol use: Not Currently    Drug use: Never       ROS  All pertinent positive symptoms are included in the history of present illness.  All other systems have been reviewed and are negative and noncontributory to this patient's current ailments.      VITAL SIGNS  Vitals:    10/04/24 1144   BP: 124/72   Pulse: 71   Temp: 36.4 °C (97.5 °F)   SpO2: 99%       PHYSICAL EXAM  GENERAL APPEARANCE: alert and oriented, Pleasant and cooperative, No Acute Distress.   HEENT: EOMI, PERRLA, TMs intact and flat bilaterally, patent nares, normal oropharynx, MMM  NECK: no lymphadenopathy, no thyromegaly.   HEART: RRR, normal S1S2, no murmurs, click or rubs.   LUNGS: clear to auscultation bilaterally, no wheezes/rhonchi/rales.   ABDOMEN: soft, non-tender, no organomegaly, no masses palpated, no guarding or rigidity.   EXTREMITIES: no edema, normal  ROM  SKIN: normal, no rash, unremarkable.   NEUROLOGIC EXAM: non-focal exam.   MUSCULOSKELETAL: no gross abnormalities.   PSYCH: affect is normal, eye contact is good.     Assessment/Plan   Problem List Items Addressed This Visit             ICD-10-CM    Anxiety F41.9     - Continue with current regimen following up with established provider  -If you notice ongoing weight issues, would recommend discussing potential adjustment of Paxil as this is a common side effect         Impaired glucose tolerance R73.02     - Will monitor A1c sugar average secondary to previous reports of impaired glucose metabolism  -Continue to focus on healthy, balanced diet with moderation of carbohydrates/processed sugars         Relevant Orders    Hemoglobin A1c    Other fatigue R53.83     - While fatigue may be significantly impacted by shift work, will check blood work for any contributing deficiencies or metabolic abnormalities         Relevant Orders    Testosterone, total and free    Vitamin D 25-Hydroxy,Total (for eval of Vitamin D levels)    Vitamin B12     Other Visit Diagnoses         Codes    Routine adult health maintenance    -  Primary Z00.00    Relevant Orders    TSH with reflex to Free T4 if abnormal    Lipid Panel    Comprehensive Metabolic Panel    CBC    Hepatitis C antibody    Screening for deficiency anemia     Z13.0    Relevant Orders    CBC    Screening for metabolic disorder     Z13.228    Relevant Orders    TSH with reflex to Free T4 if abnormal    Lipid Panel    Comprehensive Metabolic Panel    Need for hepatitis C screening test     Z11.59    Relevant Orders    Hepatitis C antibody            Additional Visit Plans:  - Complete history and physical examination was performed    GENERAL RECOMMENDATIONS:  - Complete review of history of physical exam completed today  - A healthy diet to maintain a normal BMI (under 25) to reduce heart disease, risk for diabetes encouraged.  - Exercising 150 minutes per week and  eating healthy to reduce heart disease.  - Blood pressure screen completed.    BLOOD TESTING:  - Orders for fasting routine blood work given today, to be completed at your earliest convenience  - Will contact you with the blood work results once received and reviewed.    -Blood work recently completed prior to today's exam which was remarkable for ____    General Recommendations include:  - Cholesterol and diabetes screen if risk factors (overweight, high blood pressure).  - Sexually transmitted infections if risk factors.  - Hepatitis C virus screen for those born between [id number] - N/A /previously completed/ordered for blood work today    VACCINATIONS RECOMMENDATIONS:  - Flu shot annually - advocated seasonally/up-to-date  - Tetanus booster every 10 years - advocated/up-to-date  - Pneumonia vaccination starting at 65 years old (or earlier if risk factors - smoker, diabetic, heart or lung conditions) -not due yet/advocated/up-to-date  - Shingles vaccine for those 50 years or older - check with your insurance for SHINGRIX coverage and get it at your local pharmacy -not due yet/advocated/up-to-date  -COVID-19 vaccine series completed with patient currently due for booster/advocated    SCREENINGS RECOMMENDATIONS:  -Colon cancer screening (with colonoscopy or Cologuard) for men and women starting at age 45 until 74 years old - not due yet/discussed and advocated/up-to-date    (female)  - Cervical cancer screening (pap test) in women starting at age 21 until age 65 years old -not due yet/advocated to be completed with female /up-to-date  - Mammogram screening for breast cancer in women starting at 40-50 years and every 1-2 years until age 74 - not due yet/advocated/up-to-date  - Bone density screening (DEXA) for osteoporosis in women aged 65 years and older (in younger women who are higher risk) - not due yet/advocated/up-to-date    SMOKING HISTORY SCREENING RECOMMENDATIONS:  - For men and women who  have a 30 pack year smoking history and currently smoke or have quit in the past 15 years, starting age 55 until age 80 years - a yearly low dose CT scan screening for lung cancer is recommended - not due yet/advocated/up-to-date  - For men only who have smoked 100+ cigarettes in a lifetime, starting age 65, until 75 years old - a one time ultrasound screening for abdominal aneurysms - not due yet/advocated/up-to-date    Next Wellness Exam/Annual Physical Due    Counseling:       Medication education:         Education:  The patient is counseled regarding potential side-effects of all new medications        Understanding:  Patient expressed understanding        Adherence:  No barriers to adherence identified      ** Please excuse any errors in grammar or translation related to this dictation. Voice recognition software was utilized to prepare this document. **

## 2024-10-11 ENCOUNTER — LAB (OUTPATIENT)
Dept: LAB | Facility: LAB | Age: 39
End: 2024-10-11
Payer: COMMERCIAL

## 2024-10-11 DIAGNOSIS — Z13.0 SCREENING FOR DEFICIENCY ANEMIA: ICD-10-CM

## 2024-10-11 DIAGNOSIS — Z11.59 NEED FOR HEPATITIS C SCREENING TEST: ICD-10-CM

## 2024-10-11 DIAGNOSIS — R53.83 OTHER FATIGUE: ICD-10-CM

## 2024-10-11 DIAGNOSIS — Z13.228 SCREENING FOR METABOLIC DISORDER: ICD-10-CM

## 2024-10-11 DIAGNOSIS — R73.02 IMPAIRED GLUCOSE TOLERANCE: ICD-10-CM

## 2024-10-11 DIAGNOSIS — Z00.00 ROUTINE ADULT HEALTH MAINTENANCE: ICD-10-CM

## 2024-10-11 LAB
25(OH)D3 SERPL-MCNC: 26 NG/ML (ref 30–100)
ALBUMIN SERPL BCP-MCNC: 4.7 G/DL (ref 3.4–5)
ALP SERPL-CCNC: 51 U/L (ref 33–120)
ALT SERPL W P-5'-P-CCNC: 31 U/L (ref 10–52)
ANION GAP SERPL CALC-SCNC: 13 MMOL/L (ref 10–20)
AST SERPL W P-5'-P-CCNC: 20 U/L (ref 9–39)
BILIRUB SERPL-MCNC: 0.9 MG/DL (ref 0–1.2)
BUN SERPL-MCNC: 20 MG/DL (ref 6–23)
CALCIUM SERPL-MCNC: 9.8 MG/DL (ref 8.6–10.3)
CHLORIDE SERPL-SCNC: 101 MMOL/L (ref 98–107)
CHOLEST SERPL-MCNC: 188 MG/DL (ref 0–199)
CHOLESTEROL/HDL RATIO: 4.2
CO2 SERPL-SCNC: 29 MMOL/L (ref 21–32)
CREAT SERPL-MCNC: 1.19 MG/DL (ref 0.5–1.3)
EGFRCR SERPLBLD CKD-EPI 2021: 80 ML/MIN/1.73M*2
ERYTHROCYTE [DISTWIDTH] IN BLOOD BY AUTOMATED COUNT: 13.1 % (ref 11.5–14.5)
EST. AVERAGE GLUCOSE BLD GHB EST-MCNC: 100 MG/DL
GLUCOSE SERPL-MCNC: 85 MG/DL (ref 74–99)
HBA1C MFR BLD: 5.1 %
HCT VFR BLD AUTO: 47.5 % (ref 41–52)
HCV AB SER QL: NONREACTIVE
HDLC SERPL-MCNC: 44.6 MG/DL
HGB BLD-MCNC: 15.5 G/DL (ref 13.5–17.5)
LDLC SERPL CALC-MCNC: 123 MG/DL
MCH RBC QN AUTO: 28.9 PG (ref 26–34)
MCHC RBC AUTO-ENTMCNC: 32.6 G/DL (ref 32–36)
MCV RBC AUTO: 89 FL (ref 80–100)
NON HDL CHOLESTEROL: 143 MG/DL (ref 0–149)
NRBC BLD-RTO: 0 /100 WBCS (ref 0–0)
PLATELET # BLD AUTO: 253 X10*3/UL (ref 150–450)
POTASSIUM SERPL-SCNC: 4.5 MMOL/L (ref 3.5–5.3)
PROT SERPL-MCNC: 7.4 G/DL (ref 6.4–8.2)
RBC # BLD AUTO: 5.36 X10*6/UL (ref 4.5–5.9)
SODIUM SERPL-SCNC: 138 MMOL/L (ref 136–145)
TRIGL SERPL-MCNC: 101 MG/DL (ref 0–149)
TSH SERPL-ACNC: 1.01 MIU/L (ref 0.44–3.98)
VIT B12 SERPL-MCNC: 566 PG/ML (ref 211–911)
VLDL: 20 MG/DL (ref 0–40)
WBC # BLD AUTO: 8.1 X10*3/UL (ref 4.4–11.3)

## 2024-10-11 PROCEDURE — 80061 LIPID PANEL: CPT

## 2024-10-11 PROCEDURE — 80053 COMPREHEN METABOLIC PANEL: CPT

## 2024-10-11 PROCEDURE — 83036 HEMOGLOBIN GLYCOSYLATED A1C: CPT

## 2024-10-11 PROCEDURE — 82607 VITAMIN B-12: CPT

## 2024-10-11 PROCEDURE — 86803 HEPATITIS C AB TEST: CPT

## 2024-10-11 PROCEDURE — 84443 ASSAY THYROID STIM HORMONE: CPT

## 2024-10-11 PROCEDURE — 82306 VITAMIN D 25 HYDROXY: CPT

## 2024-10-11 PROCEDURE — 36415 COLL VENOUS BLD VENIPUNCTURE: CPT

## 2024-10-11 PROCEDURE — 84402 ASSAY OF FREE TESTOSTERONE: CPT

## 2024-10-11 PROCEDURE — 85027 COMPLETE CBC AUTOMATED: CPT

## 2024-10-14 ENCOUNTER — PATIENT MESSAGE (OUTPATIENT)
Dept: PRIMARY CARE | Facility: CLINIC | Age: 39
End: 2024-10-14
Payer: COMMERCIAL

## 2024-10-14 DIAGNOSIS — E55.9 VITAMIN D DEFICIENCY: Primary | ICD-10-CM

## 2024-10-18 LAB
TESTOSTERONE FREE (CHAN): 61 PG/ML (ref 35–155)
TESTOSTERONE,TOTAL,LC-MS/MS: 415 NG/DL (ref 250–1100)

## 2024-10-22 RX ORDER — ASPIRIN 325 MG
50000 TABLET, DELAYED RELEASE (ENTERIC COATED) ORAL WEEKLY
Qty: 12 CAPSULE | Refills: 0 | Status: SHIPPED | OUTPATIENT
Start: 2024-10-22 | End: 2025-01-20

## 2025-05-27 ENCOUNTER — APPOINTMENT (OUTPATIENT)
Dept: PRIMARY CARE | Facility: CLINIC | Age: 40
End: 2025-05-27
Payer: COMMERCIAL

## 2025-05-27 VITALS
HEIGHT: 66 IN | OXYGEN SATURATION: 98 % | BODY MASS INDEX: 33.97 KG/M2 | SYSTOLIC BLOOD PRESSURE: 140 MMHG | WEIGHT: 211.4 LBS | DIASTOLIC BLOOD PRESSURE: 90 MMHG | TEMPERATURE: 97.9 F | HEART RATE: 51 BPM

## 2025-05-27 DIAGNOSIS — Z00.00 ENCOUNTER FOR ANNUAL PHYSICAL EXAM: Primary | ICD-10-CM

## 2025-05-27 DIAGNOSIS — T14.8XXA BRUISING: ICD-10-CM

## 2025-05-27 DIAGNOSIS — F41.8 ANXIETY WITH DEPRESSION: ICD-10-CM

## 2025-05-27 DIAGNOSIS — E78.00 PURE HYPERCHOLESTEROLEMIA: ICD-10-CM

## 2025-05-27 PROBLEM — E78.2 HYPERLIPEMIA, MIXED: Status: RESOLVED | Noted: 2024-10-04 | Resolved: 2025-05-27

## 2025-05-27 PROCEDURE — 3008F BODY MASS INDEX DOCD: CPT | Performed by: INTERNAL MEDICINE

## 2025-05-27 PROCEDURE — 3077F SYST BP >= 140 MM HG: CPT | Performed by: INTERNAL MEDICINE

## 2025-05-27 PROCEDURE — 1036F TOBACCO NON-USER: CPT | Performed by: INTERNAL MEDICINE

## 2025-05-27 PROCEDURE — 3080F DIAST BP >= 90 MM HG: CPT | Performed by: INTERNAL MEDICINE

## 2025-05-27 PROCEDURE — 99385 PREV VISIT NEW AGE 18-39: CPT | Performed by: INTERNAL MEDICINE

## 2025-05-27 RX ORDER — PAROXETINE 20 MG/1
20 TABLET, FILM COATED ORAL EVERY MORNING
Qty: 90 TABLET | Refills: 3 | Status: SHIPPED | OUTPATIENT
Start: 2025-05-27 | End: 2026-05-27

## 2025-05-27 ASSESSMENT — ENCOUNTER SYMPTOMS
ABDOMINAL DISTENTION: 0
FACIAL ASYMMETRY: 0
DYSPHORIC MOOD: 0
PHOTOPHOBIA: 0
CHEST TIGHTNESS: 0
SHORTNESS OF BREATH: 0
ACTIVITY CHANGE: 0
CONFUSION: 0
DYSURIA: 0
FEVER: 0
BACK PAIN: 0
SLEEP DISTURBANCE: 0
SORE THROAT: 0
SEIZURES: 0
DIARRHEA: 0
HEADACHES: 0
NERVOUS/ANXIOUS: 0
MYALGIAS: 0
VOMITING: 0
PALPITATIONS: 0
TROUBLE SWALLOWING: 0
WHEEZING: 0
FATIGUE: 0
WEAKNESS: 0
BRUISES/BLEEDS EASILY: 1
CONSTIPATION: 0
SINUS PRESSURE: 0
SINUS PAIN: 0
DIZZINESS: 0
STRIDOR: 0
NUMBNESS: 0
EYE DISCHARGE: 0
COUGH: 0
POLYDIPSIA: 0
POLYPHAGIA: 0
ARTHRALGIAS: 0
BLOOD IN STOOL: 0
NECK STIFFNESS: 0
WOUND: 0
HALLUCINATIONS: 0
HEMATURIA: 0
VOICE CHANGE: 0
FREQUENCY: 0
NAUSEA: 0
SPEECH DIFFICULTY: 0
DIFFICULTY URINATING: 0
EYE REDNESS: 0
COLOR CHANGE: 1
FLANK PAIN: 0
ABDOMINAL PAIN: 0
APPETITE CHANGE: 0
RHINORRHEA: 0
CHOKING: 0

## 2025-05-27 ASSESSMENT — PATIENT HEALTH QUESTIONNAIRE - PHQ9
1. LITTLE INTEREST OR PLEASURE IN DOING THINGS: NOT AT ALL
SUM OF ALL RESPONSES TO PHQ9 QUESTIONS 1 AND 2: 0
1. LITTLE INTEREST OR PLEASURE IN DOING THINGS: NOT AT ALL
2. FEELING DOWN, DEPRESSED OR HOPELESS: NOT AT ALL
2. FEELING DOWN, DEPRESSED OR HOPELESS: NOT AT ALL
SUM OF ALL RESPONSES TO PHQ9 QUESTIONS 1 AND 2: 0

## 2025-05-27 ASSESSMENT — PAIN SCALES - GENERAL: PAINLEVEL_OUTOF10: 0-NO PAIN

## 2025-05-27 NOTE — PROGRESS NOTES
Subjective   Patient ID: Armen Botello is a 39 y.o. male who presents for Bleeding/Bruising (Bruise on body ).    HPI   Patient presented to the office for first time to establish care. He has bluish discoloration of left lower extremity at the multiple site between left knee and ankle. He also has skin dermatitis of the shin of right LE. He has seen a dermatologist in the past as is on topical steroid. He does not recall any history of trauma. No family history of blood disorder. This appeared in past 7 days otherwise doing good and no problem.    Patient is on paroxetine 20 mg oral tablet daily. His depression and anxiety is well controlled with the medication.    Review of Systems   Constitutional:  Negative for activity change, appetite change, fatigue and fever.   HENT:  Negative for congestion, dental problem, ear pain, hearing loss, rhinorrhea, sinus pressure, sinus pain, sore throat, trouble swallowing and voice change.    Eyes:  Negative for photophobia, discharge, redness and visual disturbance.   Respiratory:  Negative for cough, choking, chest tightness, shortness of breath, wheezing and stridor.    Cardiovascular:  Negative for chest pain, palpitations and leg swelling.   Gastrointestinal:  Negative for abdominal distention, abdominal pain, blood in stool, constipation, diarrhea, nausea and vomiting.   Endocrine: Negative for polydipsia, polyphagia and polyuria.   Genitourinary:  Negative for difficulty urinating, dysuria, flank pain, frequency, hematuria and urgency.   Musculoskeletal:  Negative for arthralgias, back pain, gait problem, myalgias and neck stiffness.   Skin:  Positive for color change. Negative for rash and wound.   Allergic/Immunologic: Negative for immunocompromised state.   Neurological:  Negative for dizziness, seizures, syncope, facial asymmetry, speech difficulty, weakness, numbness and headaches.   Hematological:  Bruises/bleeds easily.   Psychiatric/Behavioral:  Negative for  "behavioral problems, confusion, dysphoric mood, hallucinations, sleep disturbance and suicidal ideas. The patient is not nervous/anxious.      Objective   /90   Pulse 51   Temp 36.6 °C (97.9 °F)   Ht 1.671 m (5' 5.79\")   Wt 95.9 kg (211 lb 6.4 oz)   SpO2 98%   BMI 34.34 kg/m²     Physical Exam  Vitals and nursing note reviewed.   Constitutional:       General: He is not in acute distress.     Appearance: Normal appearance. He is not ill-appearing or toxic-appearing.   HENT:      Head: Normocephalic.      Nose: Nose normal. No congestion.      Mouth/Throat:      Mouth: Mucous membranes are moist.   Eyes:      General: No scleral icterus.     Extraocular Movements: Extraocular movements intact.      Conjunctiva/sclera: Conjunctivae normal.      Pupils: Pupils are equal, round, and reactive to light.   Cardiovascular:      Rate and Rhythm: Normal rate and regular rhythm.      Pulses: Normal pulses.      Heart sounds: Normal heart sounds. No murmur heard.     No gallop.   Pulmonary:      Effort: Pulmonary effort is normal. No respiratory distress.      Breath sounds: Normal breath sounds. No stridor. No wheezing, rhonchi or rales.   Abdominal:      General: Abdomen is flat. Bowel sounds are normal.      Palpations: Abdomen is soft.      Tenderness: There is no abdominal tenderness.   Musculoskeletal:         General: No swelling or deformity. Normal range of motion.      Cervical back: Normal range of motion and neck supple. No rigidity or tenderness.      Right lower leg: No edema.      Left lower leg: No edema.   Lymphadenopathy:      Cervical: No cervical adenopathy.   Skin:     General: Skin is warm.      Coloration: Skin is not jaundiced.      Findings: Bruising and rash present. No erythema or lesion.   Neurological:      General: No focal deficit present.      Mental Status: He is alert and oriented to person, place, and time.      Cranial Nerves: No cranial nerve deficit.      Motor: No weakness.     "  Coordination: Coordination normal.      Gait: Gait normal.   Psychiatric:         Mood and Affect: Mood normal.         Behavior: Behavior normal.         Thought Content: Thought content normal.         Judgment: Judgment normal.       Assessment/Plan   Problem List Items Addressed This Visit       Pure hypercholesterolemia    Relevant Orders    Lipid Panel     Other Visit Diagnoses         Encounter for annual physical exam    -  Primary    Relevant Orders    Comprehensive Metabolic Panel    Hemoglobin A1C    Vitamin D 25-Hydroxy,Total (for eval of Vitamin D levels)    TSH with reflex to Free T4 if abnormal    HIV 1/2 Antigen/Antibody Screen with Reflex to Confirmation      Anxiety with depression        Relevant Medications    PARoxetine (Paxil) 20 mg tablet      Bruising        Relevant Orders    CBC and Auto Differential    Protime-INR          RTC in 2 weeks.

## 2025-06-03 LAB
25(OH)D3+25(OH)D2 SERPL-MCNC: 35 NG/ML (ref 30–100)
ALBUMIN SERPL-MCNC: 4.4 G/DL (ref 3.6–5.1)
ALP SERPL-CCNC: 67 U/L (ref 36–130)
ALT SERPL-CCNC: 22 U/L (ref 9–46)
ANION GAP SERPL CALCULATED.4IONS-SCNC: 10 MMOL/L (CALC) (ref 7–17)
AST SERPL-CCNC: 24 U/L (ref 10–40)
BASOPHILS # BLD AUTO: 70 CELLS/UL (ref 0–200)
BASOPHILS NFR BLD AUTO: 1 %
BILIRUB SERPL-MCNC: 0.4 MG/DL (ref 0.2–1.2)
BUN SERPL-MCNC: 19 MG/DL (ref 7–25)
CALCIUM SERPL-MCNC: 9.1 MG/DL (ref 8.6–10.3)
CHLORIDE SERPL-SCNC: 103 MMOL/L (ref 98–110)
CHOLEST SERPL-MCNC: 176 MG/DL
CHOLEST/HDLC SERPL: 3.4 (CALC)
CO2 SERPL-SCNC: 25 MMOL/L (ref 20–32)
CREAT SERPL-MCNC: 0.91 MG/DL (ref 0.6–1.26)
EGFRCR SERPLBLD CKD-EPI 2021: 111 ML/MIN/1.73M2
EOSINOPHIL # BLD AUTO: 406 CELLS/UL (ref 15–500)
EOSINOPHIL NFR BLD AUTO: 5.8 %
ERYTHROCYTE [DISTWIDTH] IN BLOOD BY AUTOMATED COUNT: 12.8 % (ref 11–15)
EST. AVERAGE GLUCOSE BLD GHB EST-MCNC: 108 MG/DL
EST. AVERAGE GLUCOSE BLD GHB EST-SCNC: 6 MMOL/L
GLUCOSE SERPL-MCNC: 90 MG/DL (ref 65–99)
HBA1C MFR BLD: 5.4 %
HCT VFR BLD AUTO: 42.5 % (ref 38.5–50)
HDLC SERPL-MCNC: 52 MG/DL
HGB BLD-MCNC: 13.6 G/DL (ref 13.2–17.1)
HIV 1+2 AB+HIV1 P24 AG SERPL QL IA: NORMAL
INR PPP: 0.9
LDLC SERPL CALC-MCNC: 110 MG/DL (CALC)
LYMPHOCYTES # BLD AUTO: 2324 CELLS/UL (ref 850–3900)
LYMPHOCYTES NFR BLD AUTO: 33.2 %
MCH RBC QN AUTO: 29.6 PG (ref 27–33)
MCHC RBC AUTO-ENTMCNC: 32 G/DL (ref 32–36)
MCV RBC AUTO: 92.4 FL (ref 80–100)
MONOCYTES # BLD AUTO: 364 CELLS/UL (ref 200–950)
MONOCYTES NFR BLD AUTO: 5.2 %
NEUTROPHILS # BLD AUTO: 3836 CELLS/UL (ref 1500–7800)
NEUTROPHILS NFR BLD AUTO: 54.8 %
NONHDLC SERPL-MCNC: 124 MG/DL (CALC)
PLATELET # BLD AUTO: 245 THOUSAND/UL (ref 140–400)
PMV BLD REES-ECKER: 10 FL (ref 7.5–12.5)
POTASSIUM SERPL-SCNC: 4.1 MMOL/L (ref 3.5–5.3)
PROT SERPL-MCNC: 6.8 G/DL (ref 6.1–8.1)
PROTHROMBIN TIME: 9.9 SEC (ref 9–11.5)
RBC # BLD AUTO: 4.6 MILLION/UL (ref 4.2–5.8)
SODIUM SERPL-SCNC: 138 MMOL/L (ref 135–146)
TRIGL SERPL-MCNC: 46 MG/DL
TSH SERPL-ACNC: 1.09 MIU/L (ref 0.4–4.5)
WBC # BLD AUTO: 7 THOUSAND/UL (ref 3.8–10.8)

## 2025-07-29 ENCOUNTER — APPOINTMENT (OUTPATIENT)
Dept: PRIMARY CARE | Facility: CLINIC | Age: 40
End: 2025-07-29
Payer: COMMERCIAL

## 2025-08-04 ENCOUNTER — APPOINTMENT (OUTPATIENT)
Dept: PRIMARY CARE | Facility: CLINIC | Age: 40
End: 2025-08-04
Payer: COMMERCIAL

## 2025-08-04 VITALS
HEART RATE: 67 BPM | TEMPERATURE: 97.2 F | SYSTOLIC BLOOD PRESSURE: 134 MMHG | BODY MASS INDEX: 31.34 KG/M2 | WEIGHT: 195 LBS | DIASTOLIC BLOOD PRESSURE: 86 MMHG | HEIGHT: 66 IN | OXYGEN SATURATION: 98 %

## 2025-08-04 DIAGNOSIS — E78.00 PURE HYPERCHOLESTEROLEMIA: ICD-10-CM

## 2025-08-04 DIAGNOSIS — F41.9 ANXIETY: Primary | ICD-10-CM

## 2025-08-04 PROBLEM — R73.02 IMPAIRED GLUCOSE TOLERANCE: Status: RESOLVED | Noted: 2024-10-04 | Resolved: 2025-08-04

## 2025-08-04 PROBLEM — I10 ESSENTIAL HYPERTENSION: Status: RESOLVED | Noted: 2024-10-04 | Resolved: 2025-08-04

## 2025-08-04 PROCEDURE — 1036F TOBACCO NON-USER: CPT | Performed by: INTERNAL MEDICINE

## 2025-08-04 PROCEDURE — 3008F BODY MASS INDEX DOCD: CPT | Performed by: INTERNAL MEDICINE

## 2025-08-04 PROCEDURE — 99213 OFFICE O/P EST LOW 20 MIN: CPT | Performed by: INTERNAL MEDICINE

## 2025-08-04 ASSESSMENT — ENCOUNTER SYMPTOMS
FATIGUE: 0
RHINORRHEA: 0
FEVER: 0
VOMITING: 0
WEAKNESS: 0
DIARRHEA: 0
DIFFICULTY URINATING: 0
ACTIVITY CHANGE: 0
SEIZURES: 0
PALPITATIONS: 0
BACK PAIN: 0
NECK STIFFNESS: 0
STRIDOR: 0
POLYDIPSIA: 0
FLANK PAIN: 0
SORE THROAT: 0
CHEST TIGHTNESS: 0
DIZZINESS: 0
BLOOD IN STOOL: 0
DYSPHORIC MOOD: 0
FREQUENCY: 0
CONSTIPATION: 0
FACIAL ASYMMETRY: 0
WHEEZING: 0
TROUBLE SWALLOWING: 0
COLOR CHANGE: 0
EYE REDNESS: 0
SPEECH DIFFICULTY: 0
ARTHRALGIAS: 0
HEADACHES: 0
WOUND: 0
ABDOMINAL PAIN: 0
CONFUSION: 0
SINUS PRESSURE: 0
EYE DISCHARGE: 0
HEMATURIA: 0
ABDOMINAL DISTENTION: 0
BRUISES/BLEEDS EASILY: 0
CHOKING: 0
DYSURIA: 0
MYALGIAS: 0
HALLUCINATIONS: 0
SINUS PAIN: 0
SHORTNESS OF BREATH: 0
APPETITE CHANGE: 0
PHOTOPHOBIA: 0
NERVOUS/ANXIOUS: 0
COUGH: 0
NAUSEA: 0
POLYPHAGIA: 0
VOICE CHANGE: 0
NUMBNESS: 0

## 2025-08-04 NOTE — PROGRESS NOTES
"Subjective   Patient ID: Armen Botello is a 40 y.o. male who presents for Follow-up.    HPI   40-year-old male who has been seen in the office today for follow-up exam.  He got his labs done last month which has been reviewed and discussed with the patient.  All of his labs are normal except LDL level of 110.    Patient is taking paroxetine 20 mg oral tablet daily for anxiety and his symptoms are controlled.    Review of Systems   Constitutional:  Negative for activity change, appetite change, fatigue and fever.   HENT:  Negative for congestion, dental problem, ear pain, hearing loss, rhinorrhea, sinus pressure, sinus pain, sneezing, sore throat, trouble swallowing and voice change.    Eyes:  Negative for photophobia, discharge, redness and visual disturbance.   Respiratory:  Negative for cough, choking, chest tightness, shortness of breath, wheezing and stridor.    Cardiovascular:  Negative for chest pain, palpitations and leg swelling.   Gastrointestinal:  Negative for abdominal distention, abdominal pain, blood in stool, constipation, diarrhea, nausea and vomiting.   Endocrine: Negative for polydipsia, polyphagia and polyuria.   Genitourinary:  Negative for difficulty urinating, dysuria, flank pain, frequency, hematuria and urgency.   Musculoskeletal:  Negative for arthralgias, back pain, gait problem, myalgias and neck stiffness.   Skin:  Negative for color change, rash and wound.   Allergic/Immunologic: Negative for immunocompromised state.   Neurological:  Negative for dizziness, seizures, syncope, facial asymmetry, speech difficulty, weakness, numbness and headaches.   Hematological:  Does not bruise/bleed easily.   Psychiatric/Behavioral:  Negative for behavioral problems, confusion, dysphoric mood, hallucinations and suicidal ideas. The patient is not nervous/anxious.      Objective   /86   Pulse 67   Temp 36.2 °C (97.2 °F)   Ht 1.676 m (5' 6\")   Wt 88.5 kg (195 lb)   SpO2 98%   BMI 31.47 kg/m² "     Physical Exam  Vitals and nursing note reviewed.   Constitutional:       General: He is not in acute distress.     Appearance: Normal appearance. He is not ill-appearing or toxic-appearing.   HENT:      Nose: Nose normal. No congestion.     Eyes:      Conjunctiva/sclera: Conjunctivae normal.     Pulmonary:      Effort: Pulmonary effort is normal.     Musculoskeletal:         General: No deformity. Normal range of motion.      Cervical back: Normal range of motion. No rigidity.      Right lower leg: No edema.      Left lower leg: No edema.     Neurological:      General: No focal deficit present.      Mental Status: He is alert and oriented to person, place, and time.      Coordination: Coordination normal.      Gait: Gait normal.     Psychiatric:         Mood and Affect: Mood normal.         Behavior: Behavior normal.       Assessment/Plan   Problem List Items Addressed This Visit       Anxiety - Primary    Continue paroxetine.         Pure hypercholesterolemia    Patient has been discussed about lifestyle modification.  Check lipid panel again at next follow-up visit.          Return to clinic in 1 year for follow-up or earlier as needed.

## 2025-08-04 NOTE — ASSESSMENT & PLAN NOTE
Patient has been discussed about lifestyle modification.  Check lipid panel again at next follow-up visit.

## 2026-08-04 ENCOUNTER — APPOINTMENT (OUTPATIENT)
Dept: PRIMARY CARE | Facility: CLINIC | Age: 41
End: 2026-08-04
Payer: COMMERCIAL